# Patient Record
Sex: FEMALE | NOT HISPANIC OR LATINO | URBAN - METROPOLITAN AREA
[De-identification: names, ages, dates, MRNs, and addresses within clinical notes are randomized per-mention and may not be internally consistent; named-entity substitution may affect disease eponyms.]

---

## 2019-07-18 ENCOUNTER — EMERGENCY (EMERGENCY)
Facility: HOSPITAL | Age: 44
LOS: 1 days | Discharge: LEFT BEFORE DISCHARGE | End: 2019-07-18

## 2019-07-18 VITALS
HEART RATE: 120 BPM | DIASTOLIC BLOOD PRESSURE: 78 MMHG | SYSTOLIC BLOOD PRESSURE: 118 MMHG | WEIGHT: 188.05 LBS | TEMPERATURE: 100 F | RESPIRATION RATE: 20 BRPM | HEIGHT: 64 IN | OXYGEN SATURATION: 96 %

## 2019-07-18 NOTE — ED ADULT NURSE NOTE - EXPLANATION OF PATIENT'S REASON FOR LEAVING
pt states she feels better and wants to follow up with her doctor. pt educated about recommendation to get blood work and monitoring but pt states she feels better and wants to go home.  pt encouraged to return to ED if necessary

## 2022-04-22 PROBLEM — Z00.00 ENCOUNTER FOR PREVENTIVE HEALTH EXAMINATION: Status: ACTIVE | Noted: 2022-04-22

## 2022-07-25 ENCOUNTER — APPOINTMENT (OUTPATIENT)
Dept: GASTROENTEROLOGY | Facility: CLINIC | Age: 47
End: 2022-07-25

## 2022-07-25 VITALS
BODY MASS INDEX: 33.22 KG/M2 | DIASTOLIC BLOOD PRESSURE: 76 MMHG | OXYGEN SATURATION: 99 % | HEART RATE: 65 BPM | WEIGHT: 197 LBS | SYSTOLIC BLOOD PRESSURE: 107 MMHG | HEIGHT: 64.75 IN

## 2022-07-25 DIAGNOSIS — Z80.3 FAMILY HISTORY OF MALIGNANT NEOPLASM OF BREAST: ICD-10-CM

## 2022-07-25 DIAGNOSIS — Z78.9 OTHER SPECIFIED HEALTH STATUS: ICD-10-CM

## 2022-07-25 DIAGNOSIS — Z87.891 PERSONAL HISTORY OF NICOTINE DEPENDENCE: ICD-10-CM

## 2022-07-25 DIAGNOSIS — K90.0 CELIAC DISEASE: ICD-10-CM

## 2022-07-25 DIAGNOSIS — G43.909 MIGRAINE, UNSPECIFIED, NOT INTRACTABLE, W/OUT STATUS MIGRAINOSUS: ICD-10-CM

## 2022-07-25 DIAGNOSIS — Z79.899 OTHER LONG TERM (CURRENT) DRUG THERAPY: ICD-10-CM

## 2022-07-25 DIAGNOSIS — Z86.59 PERSONAL HISTORY OF OTHER MENTAL AND BEHAVIORAL DISORDERS: ICD-10-CM

## 2022-07-25 PROCEDURE — 99204 OFFICE O/P NEW MOD 45 MIN: CPT

## 2022-07-25 RX ORDER — ONABOTULINUMTOXINA 200 [USP'U]/1
200 INJECTION, POWDER, LYOPHILIZED, FOR SOLUTION INTRADERMAL; INTRAMUSCULAR
Qty: 1 | Refills: 0 | Status: ACTIVE | COMMUNITY
Start: 2021-08-20

## 2022-07-25 NOTE — ASSESSMENT
[FreeTextEntry1] : IMPRESSION: \par #  Celiac disease - follows a strict gluten diet\par -  She had blood test and found to have celiac disease,\par -  She had a repeat endoscopy a year later after gluten exposure and was told that biopsies showed celiac disease. \par -  Grandfather  from Non-Hodgkins lymphoma - he may have had celiac disease. \par \par #  Chronic constipation - since 14 years \par -  A couple of time she took linzess, but had chest pain, sweats and diarrhea. \par -  She has tried MiraLAX three times a day - responds, has bowel movement but makes her very gassy. \par -  Used to take 6 stool softeners every night - did not help. \par -  Colonoscopy 5 years ago by Dr. Chang- no pathology found - was told to have repeat exam in 10 years.\par  -  She has two or three total colonoscopies - no pathology found - last exam 5 years ago. \par \par #  Patient denies family history of GI cancers.\par \par \par PLAN:\par DIscussed long term complications of gluten exposure.  She will adhere to a gluten free diet.  Blood test once a day \par \par I have advised the patient to arrange for a colonoscopy to rule out colon polyps, colorectal cancer etc. under monitored anesthesia care.  Risks such as perforation  (4 in 10,000) requiring surgery, bleeding (8 in 10,000), infection, diverticulitis, colitis, missed colon cancer (2% to 6%), internal organ injury, etc, adverse reaction to medication etc. and risks of anesthesia including cardiopulmonary compromise were discussed with patient.  Alternatives were discussed.  Patient verbalized understanding and agrees to proceed with a colonoscopy under anesthesia.  MiraLAX once a day. \par \par

## 2022-07-25 NOTE — PHYSICAL EXAM
[General Appearance - Alert] : alert [General Appearance - In No Acute Distress] : in no acute distress [Sclera] : the sclera and conjunctiva were normal [Outer Ear] : the ears and nose were normal in appearance [Hearing Threshold Finger Rub Not [place]] : hearing was normal [Neck Appearance] : the appearance of the neck was normal [Neck Cervical Mass (___cm)] : no neck mass was observed [Auscultation Breath Sounds / Voice Sounds] : lungs were clear to auscultation bilaterally [Heart Rate And Rhythm] : heart rate was normal and rhythm regular [Heart Sounds] : normal S1 and S2 [Heart Sounds Gallop] : no gallops [Murmurs] : no murmurs [Heart Sounds Pericardial Friction Rub] : no pericardial rub [Bowel Sounds] : normal bowel sounds [Abdomen Soft] : soft [Abdomen Tenderness] : non-tender [Abdomen Mass (___ Cm)] : no abdominal mass palpated [Cervical Lymph Nodes Enlarged Posterior Bilaterally] : posterior cervical [Cervical Lymph Nodes Enlarged Anterior Bilaterally] : anterior cervical [Supraclavicular Lymph Nodes Enlarged Bilaterally] : supraclavicular [Abnormal Walk] : normal gait [Nail Clubbing] : no clubbing  or cyanosis of the fingernails [Skin Color & Pigmentation] : normal skin color and pigmentation [] : no rash [Oriented To Time, Place, And Person] : oriented to person, place, and time [Impaired Insight] : insight and judgment were intact [Affect] : the affect was normal

## 2022-07-25 NOTE — HISTORY OF PRESENT ILLNESS
[de-identified] : 48 y/o mother of two with Hx of migraines, depression who presents for evaluation of celiac disease, and constipation. \par \par 14 years ago she had IBS symptoms, developed anal fissure from constipation - she had blood test and found to have celiac disease, she then avoided gluten and had upper endoscopy and biopsies were negative (she had been excluding gluten).  She had a repeat endoscopy a year later after gluten exposure and was told that biopsies showed celiac disease. \par \par Since then gluten free diet (strict). \par \par Feels OK while she is on a gluten free diet - less anxiety, less brain fog, headaches, less eczema.\par \par Constipation since 14 years - has a bowel movement (small) daily - when she has cross contamination of gluten the diarrhea.A couple of time she took linzess, but had chest pain, sweats and diarrhea.   Occasional rectal bleeding noticed when wiping.  \par \par She has tried MiraLAX three times a day - responds, has bowel movement but makes her very gassy. \par \par Used to take 6 stool softeners every night - did not help. \par \par Sensitive to dairy. \par \par Colonoscopy 5 years ago by Dr. Chang- no pathology found - was told to have repeat exam in 10 years.\par  \par She has two or three total colonoscopies - no pathology found - last exam 5 years ago. \par \par Grandfather  from Non-Hodgkins lymphoma - he may have had celiac disease.  \par Patient denies family history of GI cancers.\par \par Sister has type 1 diabetes, and another sister has RA. \par \par Fatigue, joint pain.  All other review of systems are negative.  Denies cardiac symptoms.

## 2022-07-28 ENCOUNTER — NON-APPOINTMENT (OUTPATIENT)
Age: 47
End: 2022-07-28

## 2022-07-28 DIAGNOSIS — K59.09 OTHER CONSTIPATION: ICD-10-CM

## 2022-07-28 LAB
ALBUMIN SERPL ELPH-MCNC: 4.3 G/DL
ALP BLD-CCNC: 63 U/L
ALT SERPL-CCNC: 19 U/L
ANION GAP SERPL CALC-SCNC: 9 MMOL/L
AST SERPL-CCNC: 20 U/L
BASOPHILS # BLD AUTO: 0.08 K/UL
BASOPHILS NFR BLD AUTO: 1.8 %
BILIRUB SERPL-MCNC: 0.2 MG/DL
BUN SERPL-MCNC: 10 MG/DL
CALCIUM SERPL-MCNC: 8.8 MG/DL
CHLORIDE SERPL-SCNC: 106 MMOL/L
CO2 SERPL-SCNC: 25 MMOL/L
CREAT SERPL-MCNC: 0.82 MG/DL
EGFR: 88 ML/MIN/1.73M2
ENDOMYSIUM IGA SER QL: NEGATIVE
ENDOMYSIUM IGA TITR SER: NORMAL
EOSINOPHIL # BLD AUTO: 0.21 K/UL
EOSINOPHIL NFR BLD AUTO: 4.7 %
FERRITIN SERPL-MCNC: 11 NG/ML
GLIADIN IGA SER QL: 6.1 UNITS
GLIADIN IGG SER QL: <5 UNITS
GLIADIN PEPTIDE IGA SER-ACNC: NEGATIVE
GLIADIN PEPTIDE IGG SER-ACNC: NEGATIVE
GLUCOSE SERPL-MCNC: 87 MG/DL
HCT VFR BLD CALC: 37.6 %
HGB BLD-MCNC: 11.2 G/DL
IGA SER QL IEP: 98 MG/DL
IMM GRANULOCYTES NFR BLD AUTO: 0.2 %
IRON SATN MFR SERPL: 8 %
IRON SERPL-MCNC: 34 UG/DL
LYMPHOCYTES # BLD AUTO: 1.05 K/UL
LYMPHOCYTES NFR BLD AUTO: 23.4 %
MAN DIFF?: NORMAL
MCHC RBC-ENTMCNC: 24.1 PG
MCHC RBC-ENTMCNC: 29.8 GM/DL
MCV RBC AUTO: 81 FL
MONOCYTES # BLD AUTO: 0.32 K/UL
MONOCYTES NFR BLD AUTO: 7.1 %
NEUTROPHILS # BLD AUTO: 2.82 K/UL
NEUTROPHILS NFR BLD AUTO: 62.8 %
PLATELET # BLD AUTO: 284 K/UL
POTASSIUM SERPL-SCNC: 4.8 MMOL/L
PROT SERPL-MCNC: 6.3 G/DL
RBC # BLD: 4.64 M/UL
RBC # FLD: 16.3 %
SODIUM SERPL-SCNC: 140 MMOL/L
TIBC SERPL-MCNC: 423 UG/DL
TTG IGA SER IA-ACNC: <1.2 U/ML
TTG IGA SER-ACNC: NEGATIVE
TTG IGG SER IA-ACNC: <1.2 U/ML
TTG IGG SER IA-ACNC: NEGATIVE
UIBC SERPL-MCNC: 389 UG/DL
WBC # FLD AUTO: 4.49 K/UL

## 2022-08-02 ENCOUNTER — NON-APPOINTMENT (OUTPATIENT)
Age: 47
End: 2022-08-02

## 2022-11-04 ENCOUNTER — APPOINTMENT (OUTPATIENT)
Dept: GASTROENTEROLOGY | Facility: HOSPITAL | Age: 47
End: 2022-11-04

## 2022-12-07 ENCOUNTER — APPOINTMENT (OUTPATIENT)
Dept: ORTHOPEDIC SURGERY | Facility: CLINIC | Age: 47
End: 2022-12-07

## 2022-12-07 DIAGNOSIS — M25.561 PAIN IN RIGHT KNEE: ICD-10-CM

## 2022-12-07 PROCEDURE — 73502 X-RAY EXAM HIP UNI 2-3 VIEWS: CPT

## 2022-12-07 PROCEDURE — 73562 X-RAY EXAM OF KNEE 3: CPT | Mod: RT

## 2022-12-07 PROCEDURE — 99213 OFFICE O/P EST LOW 20 MIN: CPT

## 2022-12-08 NOTE — DISCUSSION/SUMMARY
[de-identified] : The patient has a right knee patellar tendonitis vs tear vs. meniscal tear.\par \par The patient's xrays are negative for fracture. \par She declined oral steroid. \par She was prescribed cyclobenzaprine.\par All risks, benefits, and alternatives were discussed for this medication.\par She will go for MRI to r/o soft tissue damage. \par She will f/u to see Dr. Simon.

## 2022-12-08 NOTE — HISTORY OF PRESENT ILLNESS
[de-identified] : The patient is a 46 yo F presenting with right knee and right hip pain. The patient reports that she has been dealing with episodic right knee pain over the past few months but earlier today felt her knee buckle and dropped to the ground. She reports this being the worst episode and her pain has not subsided. The patient did not hear a pop at the time of fall. She denies sensation change or paresthesias. She has mild swelling to the anterior knee. She has not had surgery in the past. She is unable and not confident to ambulate at this time.

## 2022-12-08 NOTE — PHYSICAL EXAM
[Normal Coordination] : normal coordination [Normal Sensation] : normal sensation [Normal Mood and Affect] : normal mood and affect [Orientated] : orientated [Able to Communicate] : able to communicate [Normal Skin] : normal skin [Well Developed] : well developed [Well Nourished] : well nourished [Peripheral vascular exam is grossly normal] : peripheral vascular exam is grossly normal [NL (120)] : flexion 120 degrees [NL (30)] : extension 30 degrees [NL (35)] : adduction 35 degrees [NL (45)] : abduction 45 degrees [2+] : posterior tibialis pulse: 2+ [NL (0)] : extension 0 degrees [4___] : hamstring 4[unfilled]/5 [] : uses wheelchair [Right] : right knee [AP] : anteroposterior [Lateral] : lateral [There are no fractures, subluxations or dislocations. No significant abnormalities are seen] : There are no fractures, subluxations or dislocations. No significant abnormalities are seen [FreeTextEntry3] : Knee exam limited due to patient pain.  [FreeTextEntry8] : + pain to patellar tendon.  [FreeTextEntry9] : ROM limited due to pain.  [TWNoteComboBox7] : flexion 50 degrees

## 2022-12-09 ENCOUNTER — RESULT REVIEW (OUTPATIENT)
Age: 47
End: 2022-12-09

## 2022-12-12 ENCOUNTER — APPOINTMENT (OUTPATIENT)
Dept: ORTHOPEDIC SURGERY | Facility: CLINIC | Age: 47
End: 2022-12-12

## 2022-12-12 VITALS — WEIGHT: 197 LBS | HEIGHT: 64 IN | BODY MASS INDEX: 33.63 KG/M2

## 2022-12-12 DIAGNOSIS — S83.241A OTHER TEAR OF MEDIAL MENISCUS, CURRENT INJURY, RIGHT KNEE, INITIAL ENCOUNTER: ICD-10-CM

## 2022-12-12 PROCEDURE — 99214 OFFICE O/P EST MOD 30 MIN: CPT

## 2022-12-12 NOTE — PHYSICAL EXAM
[Normal Sensation] : normal sensation [Normal Mood and Affect] : normal mood and affect [Orientated] : orientated [Right] : right knee [NL (140)] : flexion 140 degrees [NL (0)] : extension 0 degrees [5___] : hamstring 5[unfilled]/5 [] : ligamentously stable

## 2022-12-12 NOTE — ASSESSMENT
[FreeTextEntry1] : 47F p/w R knee MMT\par \par Begin PT\par Meloxicam for pain\par f/u sports team re possible knee scope

## 2022-12-12 NOTE — HISTORY OF PRESENT ILLNESS
[Sudden] : sudden [3] : 3 [2] : 2 [Dull/Aching] : dull/aching [Radiating] : radiating [Constant] : constant [Meds] : meds [Walking] : walking [de-identified] : 47F p/w R knee pain. She notes her knee gave out on her while walking and was unable to walk well after. She was seen at  and given a knee brace and sent for an MRI. She conplains of posterior pain and medial/lateral pain. [] : no [FreeTextEntry1] : rt KNEE  [FreeTextEntry3] : 12/8/22 [FreeTextEntry5] : pt has been having intermittent knee pain until 12/8/22 her knee gave out and she was unable to get up pt visited local [facility] received mri and dx of meniscal tear  [FreeTextEntry7] : glute to mid calf  [de-identified] : mri

## 2022-12-13 ENCOUNTER — FORM ENCOUNTER (OUTPATIENT)
Age: 47
End: 2022-12-13

## 2022-12-21 DIAGNOSIS — K90.0 CELIAC DISEASE: ICD-10-CM

## 2022-12-22 ENCOUNTER — APPOINTMENT (OUTPATIENT)
Dept: ORTHOPEDIC SURGERY | Facility: CLINIC | Age: 47
End: 2022-12-22

## 2022-12-27 ENCOUNTER — NON-APPOINTMENT (OUTPATIENT)
Age: 47
End: 2022-12-27

## 2022-12-29 ENCOUNTER — TRANSCRIPTION ENCOUNTER (OUTPATIENT)
Age: 47
End: 2022-12-29

## 2022-12-30 ENCOUNTER — OUTPATIENT (OUTPATIENT)
Dept: OUTPATIENT SERVICES | Facility: HOSPITAL | Age: 47
LOS: 1 days | End: 2022-12-30
Payer: COMMERCIAL

## 2022-12-30 ENCOUNTER — APPOINTMENT (OUTPATIENT)
Dept: GASTROENTEROLOGY | Facility: HOSPITAL | Age: 47
End: 2022-12-30

## 2022-12-30 DIAGNOSIS — Z12.11 ENCOUNTER FOR SCREENING FOR MALIGNANT NEOPLASM OF COLON: ICD-10-CM

## 2022-12-30 DIAGNOSIS — K59.09 OTHER CONSTIPATION: ICD-10-CM

## 2022-12-30 LAB — HCG UR QL: NEGATIVE — SIGNIFICANT CHANGE UP

## 2022-12-30 PROCEDURE — G0121: CPT

## 2022-12-30 PROCEDURE — 45378 DIAGNOSTIC COLONOSCOPY: CPT

## 2022-12-30 PROCEDURE — 81025 URINE PREGNANCY TEST: CPT

## 2023-01-19 ENCOUNTER — APPOINTMENT (OUTPATIENT)
Dept: ORTHOPEDIC SURGERY | Facility: CLINIC | Age: 48
End: 2023-01-19

## 2023-10-24 ENCOUNTER — NON-APPOINTMENT (OUTPATIENT)
Age: 48
End: 2023-10-24

## 2023-10-24 RX ORDER — SERTRALINE HYDROCHLORIDE 100 MG/1
100 TABLET, FILM COATED ORAL
Qty: 135 | Refills: 0 | Status: DISCONTINUED | COMMUNITY
Start: 2022-06-21 | End: 2023-10-24

## 2023-10-24 RX ORDER — LAMOTRIGINE 250 MG/1
250 TABLET, FILM COATED, EXTENDED RELEASE ORAL
Refills: 0 | Status: ACTIVE | COMMUNITY
Start: 2023-10-24

## 2023-10-24 RX ORDER — POLYETHYLENE GLYCOL-3350, SODIUM CHLORIDE, POTASSIUM CHLORIDE AND SODIUM BICARBONATE 420; 11.2; 5.72; 1.48 G/438.4G; G/438.4G; G/438.4G; G/438.4G
420 POWDER, FOR SOLUTION ORAL
Qty: 1 | Refills: 0 | Status: DISCONTINUED | COMMUNITY
Start: 2022-07-28 | End: 2023-10-24

## 2023-10-24 RX ORDER — SERTRALINE HYDROCHLORIDE 50 MG/1
50 TABLET, FILM COATED ORAL DAILY
Refills: 0 | Status: ACTIVE | COMMUNITY
Start: 2023-10-24

## 2023-10-24 RX ORDER — MAGNESIUM 30 MG
30 TABLET ORAL
Refills: 0 | Status: ACTIVE | COMMUNITY
Start: 2023-10-24

## 2023-10-24 RX ORDER — RIBOFLAVIN (VITAMIN B2) 100 MG
100 TABLET ORAL
Refills: 0 | Status: ACTIVE | COMMUNITY
Start: 2023-10-24

## 2023-10-24 RX ORDER — CALCIUM CARBONATE/VITAMIN D3 500-10/5ML
30 LIQUID (ML) ORAL
Refills: 0 | Status: ACTIVE | COMMUNITY
Start: 2023-10-24

## 2023-10-24 RX ORDER — CYCLOBENZAPRINE HYDROCHLORIDE 15 MG/1
15 CAPSULE, EXTENDED RELEASE ORAL DAILY
Qty: 14 | Refills: 0 | Status: DISCONTINUED | COMMUNITY
Start: 2022-12-07 | End: 2023-10-24

## 2023-10-24 RX ORDER — SERTRALINE 25 MG/1
25 TABLET, FILM COATED ORAL DAILY
Refills: 0 | Status: ACTIVE | COMMUNITY
Start: 2023-10-24

## 2023-10-24 RX ORDER — OMEPRAZOLE 20 MG/1
20 TABLET, DELAYED RELEASE ORAL
Refills: 0 | Status: ACTIVE | COMMUNITY
Start: 2023-10-24

## 2023-10-24 RX ORDER — SEMAGLUTIDE 2.4 MG/.75ML
2.4 INJECTION, SOLUTION SUBCUTANEOUS
Refills: 0 | Status: ACTIVE | COMMUNITY
Start: 2023-10-24

## 2023-10-27 ENCOUNTER — NON-APPOINTMENT (OUTPATIENT)
Age: 48
End: 2023-10-27

## 2023-10-27 ENCOUNTER — APPOINTMENT (OUTPATIENT)
Dept: CARDIOLOGY | Facility: CLINIC | Age: 48
End: 2023-10-27
Payer: COMMERCIAL

## 2023-10-27 VITALS
DIASTOLIC BLOOD PRESSURE: 70 MMHG | WEIGHT: 175 LBS | SYSTOLIC BLOOD PRESSURE: 102 MMHG | HEART RATE: 75 BPM | BODY MASS INDEX: 30.04 KG/M2 | OXYGEN SATURATION: 98 %

## 2023-10-27 DIAGNOSIS — E78.5 HYPERLIPIDEMIA, UNSPECIFIED: ICD-10-CM

## 2023-10-27 DIAGNOSIS — F31.81 BIPOLAR II DISORDER: ICD-10-CM

## 2023-10-27 DIAGNOSIS — Z83.3 FAMILY HISTORY OF DIABETES MELLITUS: ICD-10-CM

## 2023-10-27 DIAGNOSIS — R55 SYNCOPE AND COLLAPSE: ICD-10-CM

## 2023-10-27 PROCEDURE — 99214 OFFICE O/P EST MOD 30 MIN: CPT | Mod: 25

## 2023-10-27 PROCEDURE — 93000 ELECTROCARDIOGRAM COMPLETE: CPT

## 2023-10-27 RX ORDER — TOPIRAMATE 50 MG/1
50 TABLET, FILM COATED ORAL
Qty: 90 | Refills: 1 | Status: ACTIVE | COMMUNITY

## 2023-11-14 ENCOUNTER — APPOINTMENT (OUTPATIENT)
Dept: CARDIOLOGY | Facility: CLINIC | Age: 48
End: 2023-11-14

## 2025-04-28 ENCOUNTER — APPOINTMENT (OUTPATIENT)
Dept: UROGYNECOLOGY | Facility: CLINIC | Age: 50
End: 2025-04-28
Payer: COMMERCIAL

## 2025-04-28 VITALS
HEIGHT: 64 IN | OXYGEN SATURATION: 98 % | SYSTOLIC BLOOD PRESSURE: 102 MMHG | HEART RATE: 82 BPM | BODY MASS INDEX: 28.51 KG/M2 | WEIGHT: 167 LBS | DIASTOLIC BLOOD PRESSURE: 62 MMHG | TEMPERATURE: 97 F

## 2025-04-28 DIAGNOSIS — N39.41 URGE INCONTINENCE: ICD-10-CM

## 2025-04-28 DIAGNOSIS — R35.0 FREQUENCY OF MICTURITION: ICD-10-CM

## 2025-04-28 DIAGNOSIS — R35.1 NOCTURIA: ICD-10-CM

## 2025-04-28 DIAGNOSIS — N39.3 STRESS INCONTINENCE (FEMALE) (MALE): ICD-10-CM

## 2025-04-28 DIAGNOSIS — N81.9 FEMALE GENITAL PROLAPSE, UNSPECIFIED: ICD-10-CM

## 2025-04-28 DIAGNOSIS — R39.15 URGENCY OF URINATION: ICD-10-CM

## 2025-04-28 LAB
BILIRUB UR QL STRIP: NEGATIVE
CLARITY UR: CLEAR
COLLECTION METHOD: NORMAL
GLUCOSE UR-MCNC: NEGATIVE
HCG UR QL: 0.2 EU/DL
HGB UR QL STRIP.AUTO: NEGATIVE
KETONES UR-MCNC: NEGATIVE
LEUKOCYTE ESTERASE UR QL STRIP: NEGATIVE
NITRITE UR QL STRIP: NEGATIVE
PH UR STRIP: 7
PROT UR STRIP-MCNC: NEGATIVE
SP GR UR STRIP: 1.01

## 2025-04-28 PROCEDURE — 81002 URINALYSIS NONAUTO W/O SCOPE: CPT

## 2025-04-28 PROCEDURE — 99204 OFFICE O/P NEW MOD 45 MIN: CPT | Mod: 25

## 2025-04-28 PROCEDURE — 99459 PELVIC EXAMINATION: CPT

## 2025-04-28 PROCEDURE — 51701 INSERT BLADDER CATHETER: CPT | Mod: 59

## 2025-05-27 ENCOUNTER — APPOINTMENT (OUTPATIENT)
Dept: UROGYNECOLOGY | Facility: CLINIC | Age: 50
End: 2025-05-27

## 2025-05-27 ENCOUNTER — NON-APPOINTMENT (OUTPATIENT)
Age: 50
End: 2025-05-27

## 2025-05-28 ENCOUNTER — APPOINTMENT (OUTPATIENT)
Dept: UROGYNECOLOGY | Facility: CLINIC | Age: 50
End: 2025-05-28

## 2025-05-28 VITALS
DIASTOLIC BLOOD PRESSURE: 70 MMHG | HEIGHT: 64 IN | SYSTOLIC BLOOD PRESSURE: 112 MMHG | WEIGHT: 174 LBS | BODY MASS INDEX: 29.71 KG/M2 | HEART RATE: 90 BPM | RESPIRATION RATE: 14 BRPM

## 2025-05-28 DIAGNOSIS — N81.9 FEMALE GENITAL PROLAPSE, UNSPECIFIED: ICD-10-CM

## 2025-05-28 DIAGNOSIS — N39.3 STRESS INCONTINENCE (FEMALE) (MALE): ICD-10-CM

## 2025-05-28 PROCEDURE — 57160 INSERT PESSARY/OTHER DEVICE: CPT

## 2025-05-28 PROCEDURE — A4562: CPT

## 2025-06-03 ENCOUNTER — APPOINTMENT (OUTPATIENT)
Dept: SURGERY | Facility: CLINIC | Age: 50
End: 2025-06-03
Payer: COMMERCIAL

## 2025-06-03 VITALS
SYSTOLIC BLOOD PRESSURE: 92 MMHG | HEART RATE: 93 BPM | WEIGHT: 170 LBS | BODY MASS INDEX: 29.02 KG/M2 | HEIGHT: 64 IN | OXYGEN SATURATION: 97 % | DIASTOLIC BLOOD PRESSURE: 67 MMHG

## 2025-06-03 DIAGNOSIS — K80.12 CALCULUS OF GALLBLADDER WITH ACUTE AND CHRONIC CHOLECYSTITIS W/OUT OBSTRUCTION: ICD-10-CM

## 2025-06-03 PROCEDURE — 99204 OFFICE O/P NEW MOD 45 MIN: CPT | Mod: 57

## 2025-06-03 RX ORDER — NAPROXEN 500 MG/1
500 TABLET ORAL
Refills: 0 | Status: ACTIVE | COMMUNITY

## 2025-06-05 ENCOUNTER — OUTPATIENT (OUTPATIENT)
Dept: OUTPATIENT SERVICES | Facility: HOSPITAL | Age: 50
LOS: 1 days | End: 2025-06-05
Payer: COMMERCIAL

## 2025-06-05 VITALS
TEMPERATURE: 98 F | RESPIRATION RATE: 16 BRPM | OXYGEN SATURATION: 99 % | HEART RATE: 90 BPM | SYSTOLIC BLOOD PRESSURE: 121 MMHG | DIASTOLIC BLOOD PRESSURE: 75 MMHG | WEIGHT: 166.89 LBS

## 2025-06-05 DIAGNOSIS — Z98.890 OTHER SPECIFIED POSTPROCEDURAL STATES: Chronic | ICD-10-CM

## 2025-06-05 DIAGNOSIS — K80.12 CALCULUS OF GALLBLADDER WITH ACUTE AND CHRONIC CHOLECYSTITIS WITHOUT OBSTRUCTION: ICD-10-CM

## 2025-06-05 DIAGNOSIS — Z01.818 ENCOUNTER FOR OTHER PREPROCEDURAL EXAMINATION: ICD-10-CM

## 2025-06-05 DIAGNOSIS — Z98.891 HISTORY OF UTERINE SCAR FROM PREVIOUS SURGERY: Chronic | ICD-10-CM

## 2025-06-05 LAB
ALBUMIN SERPL ELPH-MCNC: 3.6 G/DL — SIGNIFICANT CHANGE UP (ref 3.3–5)
ALP SERPL-CCNC: 73 U/L — SIGNIFICANT CHANGE UP (ref 40–120)
ALT FLD-CCNC: 28 U/L — SIGNIFICANT CHANGE UP (ref 12–78)
ANION GAP SERPL CALC-SCNC: 8 MMOL/L — SIGNIFICANT CHANGE UP (ref 5–17)
AST SERPL-CCNC: 12 U/L — LOW (ref 15–37)
BILIRUB SERPL-MCNC: 0.4 MG/DL — SIGNIFICANT CHANGE UP (ref 0.2–1.2)
BLD GP AB SCN SERPL QL: SIGNIFICANT CHANGE UP
BUN SERPL-MCNC: 4 MG/DL — LOW (ref 7–23)
CALCIUM SERPL-MCNC: 9.4 MG/DL — SIGNIFICANT CHANGE UP (ref 8.5–10.1)
CHLORIDE SERPL-SCNC: 109 MMOL/L — HIGH (ref 96–108)
CO2 SERPL-SCNC: 25 MMOL/L — SIGNIFICANT CHANGE UP (ref 22–31)
CREAT SERPL-MCNC: 0.87 MG/DL — SIGNIFICANT CHANGE UP (ref 0.5–1.3)
EGFR: 80 ML/MIN/1.73M2 — SIGNIFICANT CHANGE UP
EGFR: 80 ML/MIN/1.73M2 — SIGNIFICANT CHANGE UP
GLUCOSE SERPL-MCNC: 78 MG/DL — SIGNIFICANT CHANGE UP (ref 70–99)
HCG UR QL: NEGATIVE — SIGNIFICANT CHANGE UP
HCT VFR BLD CALC: 39.6 % — SIGNIFICANT CHANGE UP (ref 34.5–45)
HGB BLD-MCNC: 13.2 G/DL — SIGNIFICANT CHANGE UP (ref 11.5–15.5)
MCHC RBC-ENTMCNC: 27.8 PG — SIGNIFICANT CHANGE UP (ref 27–34)
MCHC RBC-ENTMCNC: 33.3 G/DL — SIGNIFICANT CHANGE UP (ref 32–36)
MCV RBC AUTO: 83.5 FL — SIGNIFICANT CHANGE UP (ref 80–100)
NRBC BLD AUTO-RTO: 0 /100 WBCS — SIGNIFICANT CHANGE UP (ref 0–0)
PLATELET # BLD AUTO: 291 K/UL — SIGNIFICANT CHANGE UP (ref 150–400)
POTASSIUM SERPL-MCNC: 3.5 MMOL/L — SIGNIFICANT CHANGE UP (ref 3.5–5.3)
POTASSIUM SERPL-SCNC: 3.5 MMOL/L — SIGNIFICANT CHANGE UP (ref 3.5–5.3)
PROT SERPL-MCNC: 7.3 G/DL — SIGNIFICANT CHANGE UP (ref 6–8.3)
RBC # BLD: 4.74 M/UL — SIGNIFICANT CHANGE UP (ref 3.8–5.2)
RBC # FLD: 12.7 % — SIGNIFICANT CHANGE UP (ref 10.3–14.5)
SODIUM SERPL-SCNC: 142 MMOL/L — SIGNIFICANT CHANGE UP (ref 135–145)
WBC # BLD: 5.49 K/UL — SIGNIFICANT CHANGE UP (ref 3.8–10.5)
WBC # FLD AUTO: 5.49 K/UL — SIGNIFICANT CHANGE UP (ref 3.8–10.5)

## 2025-06-05 PROCEDURE — 81025 URINE PREGNANCY TEST: CPT

## 2025-06-05 PROCEDURE — 86900 BLOOD TYPING SEROLOGIC ABO: CPT

## 2025-06-05 PROCEDURE — 86901 BLOOD TYPING SEROLOGIC RH(D): CPT

## 2025-06-05 PROCEDURE — 86850 RBC ANTIBODY SCREEN: CPT

## 2025-06-05 PROCEDURE — 80053 COMPREHEN METABOLIC PANEL: CPT

## 2025-06-05 PROCEDURE — G0463: CPT

## 2025-06-05 PROCEDURE — 85027 COMPLETE CBC AUTOMATED: CPT

## 2025-06-05 PROCEDURE — 36415 COLL VENOUS BLD VENIPUNCTURE: CPT

## 2025-06-05 NOTE — H&P PST ADULT - NSICDXPASTMEDICALHX_GEN_ALL_CORE_FT
PAST MEDICAL HISTORY:  Celiac disease      PAST MEDICAL HISTORY:  ADHD     Anxiety and depression     Calculus of gallbladder with acute and chronic cholecystitis without obstruction     Celiac disease     Seasonal allergies

## 2025-06-05 NOTE — H&P PST ADULT - PROBLEM SELECTOR PLAN 2
Medical clearance needed as per surgeon. CBC, Comprehensive panel, T&S and UCG ordered. Pre-op instructions and surgical scrubs given and pt verbalized understanding.

## 2025-06-05 NOTE — H&P PST ADULT - NSICDXFAMILYHX_GEN_ALL_CORE_FT
FAMILY HISTORY:  Father  Still living? Yes, Estimated age: Age Unknown  FH: hyperlipidemia, Age at diagnosis: Age Unknown    Mother  Still living? Yes, Estimated age: Age Unknown  Family history of breast cancer in mother, Age at diagnosis: Age Unknown  FH: CAD (coronary artery disease), Age at diagnosis: Age Unknown  FH: hypertension, Age at diagnosis: Age Unknown    Sibling  Still living? Yes, Estimated age: Age Unknown  FH: type 1 diabetes mellitus, Age at diagnosis: Age Unknown

## 2025-06-05 NOTE — H&P PST ADULT - ASSESSMENT
51 y/o female with calculus of gallbladder with acute and chronic cholecystitis without obstruction.

## 2025-06-05 NOTE — H&P PST ADULT - HISTORY OF PRESENT ILLNESS
49 y/o female with PMH of Bipolar disorder     Pt s/p ER visit at [facility] on 6/1/25 and diagnosed with cholelithiasis. Pt currently on low fat diet.   Pt still complaining of RUQ abdominal pain after eating fatty foods.   Pt denies n/v/d and constipation.  49 y/o female with PMH of Bipolar disorder, ADHD, anxiety and depression here for PST. Pt s/p ER visit at [facility] on 6/1/25 for severe abdominal pain and diagnosed with cholelithiasis. Pt still complaining of RUQ abdominal pain after eating fatty foods.   Pt denies n/v/d and constipation.  Pt currently on low fat diet. Pt electing for robotic assisted laparoscopic cholecystectomy on 6/13/25.

## 2025-06-12 NOTE — ASU PATIENT PROFILE, ADULT - FALL HARM RISK - UNIVERSAL INTERVENTIONS
Bed in lowest position, wheels locked, appropriate side rails in place/Call bell, personal items and telephone in reach/Instruct patient to call for assistance before getting out of bed or chair/Non-slip footwear when patient is out of bed/[place] to call system/Physically safe environment - no spills, clutter or unnecessary equipment/Purposeful Proactive Rounding/Room/bathroom lighting operational, light cord in reach

## 2025-06-12 NOTE — ASU PATIENT PROFILE, ADULT - NSICDXPASTMEDICALHX_GEN_ALL_CORE_FT
PAST MEDICAL HISTORY:  ADHD     Anxiety and depression     Calculus of gallbladder with acute and chronic cholecystitis without obstruction     Celiac disease     Seasonal allergies

## 2025-06-13 ENCOUNTER — APPOINTMENT (OUTPATIENT)
Dept: SURGERY | Facility: HOSPITAL | Age: 50
End: 2025-06-13

## 2025-06-13 ENCOUNTER — OUTPATIENT (OUTPATIENT)
Dept: OUTPATIENT SERVICES | Facility: HOSPITAL | Age: 50
LOS: 1 days | End: 2025-06-13
Payer: COMMERCIAL

## 2025-06-13 ENCOUNTER — RESULT REVIEW (OUTPATIENT)
Age: 50
End: 2025-06-13

## 2025-06-13 ENCOUNTER — TRANSCRIPTION ENCOUNTER (OUTPATIENT)
Age: 50
End: 2025-06-13

## 2025-06-13 VITALS
HEIGHT: 63.98 IN | RESPIRATION RATE: 12 BRPM | WEIGHT: 167.11 LBS | SYSTOLIC BLOOD PRESSURE: 95 MMHG | DIASTOLIC BLOOD PRESSURE: 68 MMHG | TEMPERATURE: 98 F | OXYGEN SATURATION: 97 % | HEART RATE: 71 BPM

## 2025-06-13 VITALS
SYSTOLIC BLOOD PRESSURE: 107 MMHG | RESPIRATION RATE: 18 BRPM | DIASTOLIC BLOOD PRESSURE: 66 MMHG | OXYGEN SATURATION: 98 % | HEART RATE: 79 BPM

## 2025-06-13 DIAGNOSIS — Z98.891 HISTORY OF UTERINE SCAR FROM PREVIOUS SURGERY: Chronic | ICD-10-CM

## 2025-06-13 DIAGNOSIS — Z98.890 OTHER SPECIFIED POSTPROCEDURAL STATES: Chronic | ICD-10-CM

## 2025-06-13 DIAGNOSIS — K80.12 CALCULUS OF GALLBLADDER WITH ACUTE AND CHRONIC CHOLECYSTITIS WITHOUT OBSTRUCTION: ICD-10-CM

## 2025-06-13 LAB
ABO RH CONFIRMATION: SIGNIFICANT CHANGE UP
HCG UR QL: NEGATIVE — SIGNIFICANT CHANGE UP

## 2025-06-13 PROCEDURE — C9399: CPT

## 2025-06-13 PROCEDURE — 88304 TISSUE EXAM BY PATHOLOGIST: CPT | Mod: 26

## 2025-06-13 PROCEDURE — S2900: CPT

## 2025-06-13 PROCEDURE — C1889: CPT

## 2025-06-13 PROCEDURE — 47563 LAPARO CHOLECYSTECTOMY/GRAPH: CPT

## 2025-06-13 PROCEDURE — 88304 TISSUE EXAM BY PATHOLOGIST: CPT

## 2025-06-13 PROCEDURE — 81025 URINE PREGNANCY TEST: CPT

## 2025-06-13 PROCEDURE — 36415 COLL VENOUS BLD VENIPUNCTURE: CPT

## 2025-06-13 PROCEDURE — S2900 ROBOTIC SURGICAL SYSTEM: CPT | Mod: NC

## 2025-06-13 DEVICE — LIGATING CLIPS WECK HEMOLOK POLYMER MEDIUM-LARGE (GREEN) 6: Type: IMPLANTABLE DEVICE | Status: FUNCTIONAL

## 2025-06-13 RX ORDER — SODIUM CHLORIDE 9 G/1000ML
1000 INJECTION, SOLUTION INTRAVENOUS
Refills: 0 | Status: DISCONTINUED | OUTPATIENT
Start: 2025-06-13 | End: 2025-06-13

## 2025-06-13 RX ORDER — ONDANSETRON HCL/PF 4 MG/2 ML
4 VIAL (ML) INJECTION EVERY 6 HOURS
Refills: 0 | Status: ACTIVE | OUTPATIENT
Start: 2025-06-13 | End: 2026-05-12

## 2025-06-13 RX ORDER — SODIUM CHLORIDE 9 G/1000ML
1000 INJECTION, SOLUTION INTRAVENOUS
Refills: 0 | Status: DISCONTINUED | OUTPATIENT
Start: 2025-06-13 | End: 2025-06-15

## 2025-06-13 RX ORDER — IBUPROFEN 200 MG
1 TABLET ORAL
Qty: 20 | Refills: 0
Start: 2025-06-13

## 2025-06-13 RX ORDER — LAMOTRIGINE 150 MG/1
250 TABLET ORAL AT BEDTIME
Refills: 0 | Status: ACTIVE | OUTPATIENT
Start: 2025-06-13 | End: 2026-05-12

## 2025-06-13 RX ORDER — CEFAZOLIN SODIUM IN 0.9 % NACL 3 G/100 ML
2000 INTRAVENOUS SOLUTION, PIGGYBACK (ML) INTRAVENOUS ONCE
Refills: 0 | Status: COMPLETED | OUTPATIENT
Start: 2025-06-13 | End: 2025-06-13

## 2025-06-13 RX ORDER — OXYCODONE HYDROCHLORIDE 30 MG/1
5 TABLET ORAL EVERY 6 HOURS
Refills: 0 | Status: DISCONTINUED | OUTPATIENT
Start: 2025-06-13 | End: 2025-06-13

## 2025-06-13 RX ORDER — SERTRALINE 100 MG/1
0 TABLET, FILM COATED ORAL
Refills: 0 | DISCHARGE

## 2025-06-13 RX ORDER — TOPIRAMATE 25 MG/1
50 TABLET, FILM COATED ORAL AT BEDTIME
Refills: 0 | Status: ACTIVE | OUTPATIENT
Start: 2025-06-13 | End: 2026-05-12

## 2025-06-13 RX ORDER — POLYETHYLENE GLYCOL 3350 17 G/17G
17 POWDER, FOR SOLUTION ORAL
Refills: 0 | DISCHARGE

## 2025-06-13 RX ORDER — TOPIRAMATE 25 MG/1
0 TABLET, FILM COATED ORAL
Refills: 4 | DISCHARGE

## 2025-06-13 RX ORDER — ACETAMINOPHEN 500 MG/5ML
2 LIQUID (ML) ORAL
Qty: 24 | Refills: 0
Start: 2025-06-13

## 2025-06-13 RX ORDER — LORAZEPAM 4 MG/ML
0 VIAL (ML) INJECTION
Refills: 0 | DISCHARGE

## 2025-06-13 RX ORDER — INDOCYANINE GREEN AND WATER 25 MG
5 KIT INJECTION ONCE
Refills: 0 | Status: COMPLETED | OUTPATIENT
Start: 2025-06-13 | End: 2025-06-13

## 2025-06-13 RX ORDER — PROMETHAZINE HYDROCHLORIDE 25 MG/ML
3.12 INJECTION INTRAMUSCULAR; INTRAVENOUS ONCE
Refills: 0 | Status: COMPLETED | OUTPATIENT
Start: 2025-06-13 | End: 2025-06-13

## 2025-06-13 RX ORDER — ACETAMINOPHEN 500 MG/5ML
1000 LIQUID (ML) ORAL EVERY 6 HOURS
Refills: 0 | Status: ACTIVE | OUTPATIENT
Start: 2025-06-13 | End: 2026-05-12

## 2025-06-13 RX ORDER — LORAZEPAM 4 MG/ML
0.5 VIAL (ML) INJECTION EVERY 12 HOURS
Refills: 0 | Status: DISCONTINUED | OUTPATIENT
Start: 2025-06-13 | End: 2025-06-13

## 2025-06-13 RX ORDER — OXYCODONE HYDROCHLORIDE 30 MG/1
1 TABLET ORAL
Qty: 12 | Refills: 0
Start: 2025-06-13

## 2025-06-13 RX ORDER — OXYCODONE HYDROCHLORIDE 30 MG/1
5 TABLET ORAL ONCE
Refills: 0 | Status: DISCONTINUED | OUTPATIENT
Start: 2025-06-13 | End: 2025-06-13

## 2025-06-13 RX ORDER — IBUPROFEN 200 MG
600 TABLET ORAL EVERY 6 HOURS
Refills: 0 | Status: ACTIVE | OUTPATIENT
Start: 2025-06-13 | End: 2026-05-12

## 2025-06-13 RX ORDER — POLYETHYLENE GLYCOL 3350 17 G/17G
17 POWDER, FOR SOLUTION ORAL
Qty: 120 | Refills: 0
Start: 2025-06-13

## 2025-06-13 RX ORDER — HYDROMORPHONE/SOD CHLOR,ISO/PF 2 MG/10 ML
0.5 SYRINGE (ML) INJECTION
Refills: 0 | Status: DISCONTINUED | OUTPATIENT
Start: 2025-06-13 | End: 2025-06-15

## 2025-06-13 RX ORDER — LAMOTRIGINE 150 MG/1
0 TABLET ORAL
Refills: 0 | DISCHARGE

## 2025-06-13 RX ORDER — SERTRALINE 100 MG/1
200 TABLET, FILM COATED ORAL DAILY
Refills: 0 | Status: ACTIVE | OUTPATIENT
Start: 2025-06-13 | End: 2026-05-12

## 2025-06-13 RX ORDER — POLYETHYLENE GLYCOL 3350 17 G/17G
17 POWDER, FOR SOLUTION ORAL EVERY 24 HOURS
Refills: 0 | Status: ACTIVE | OUTPATIENT
Start: 2025-06-13 | End: 2026-05-12

## 2025-06-13 RX ADMIN — SODIUM CHLORIDE 75 MILLILITER(S): 9 INJECTION, SOLUTION INTRAVENOUS at 16:24

## 2025-06-13 RX ADMIN — Medication 0.5 MILLIGRAM(S): at 16:42

## 2025-06-13 RX ADMIN — INDOCYANINE GREEN AND WATER 5 MILLIGRAM(S): KIT at 13:50

## 2025-06-13 RX ADMIN — SODIUM CHLORIDE 75 MILLILITER(S): 9 INJECTION, SOLUTION INTRAVENOUS at 14:02

## 2025-06-13 RX ADMIN — OXYCODONE HYDROCHLORIDE 5 MILLIGRAM(S): 30 TABLET ORAL at 17:40

## 2025-06-13 RX ADMIN — PROMETHAZINE HYDROCHLORIDE 200 MILLIGRAM(S): 25 INJECTION INTRAMUSCULAR; INTRAVENOUS at 17:48

## 2025-06-13 RX ADMIN — Medication 0.5 MILLIGRAM(S): at 17:02

## 2025-06-13 RX ADMIN — Medication 0.5 MILLIGRAM(S): at 16:24

## 2025-06-13 RX ADMIN — OXYCODONE HYDROCHLORIDE 5 MILLIGRAM(S): 30 TABLET ORAL at 18:10

## 2025-06-13 NOTE — BRIEF OPERATIVE NOTE - FIRST ASSIST PARTICIPATION DETAILS - (COMPONENTS OF THE PROCEDURE THAT ASSISTANT PARTICIPATED IN)
DATE OF VISIT: 6/25/2019    REASON FOR VISIT: Followup for Non-small cell lung cancer-metastatic      HISTORY OF PRESENT ILLNESS: The patient is a very pleasant 62 y.o. male with past medical history significant for EGFR related non-small cell lung cancer-metastatic with widespread bony metastasis and multiple pulmonary nodules, diagnosed February 2018.  He developed a dry cough and shortness of breath in Dec 2017.  A chest XR in January 2018 revealed a right lung mass. A CT scan was ordered and revealed several centimeter right upper  Lung field with associated multiple bilateral nodular densities consistent with metastatic disease.  He was referred and underwent a CT directed needle biopsy on 01/10/2018 which showed malignancy compatible with non-small cell lung cancer.  On January 17, 2018 he underwent a CT FNA.  Pathology was consistent with a non-small cell carcinoma but inadequate tissue specimen for molecular testing.  As part of his workup, he underwent a PET/CT scan today which revealed a moderately sized right pneumothorax. He was also found to have numerous small brain mets. The patient was admitted for a chest tube placement and management of his pneumothorax.  He saw Dr. Silva at [facility] in January 2018. Caris testing performed.Tumor is EGFR [id number] mutated.  Tagrisso was recommended and patient started on 02/2018.  The patient is here today for follow up.    SUBJECTIVE: The patient is here today with his wife. He has been doing fairly well. He continues to take his Tagrisso as prescribed. He does have a cough that he think is related to post-nasal drip. He is taking Zyrtec at night but still has quite a bit of sneezing and congestion. He is eating and drinking well. He denies skin rash or diarrhea. He has no new headaches or visual changes. He denies chest pain or palpitations.     PAST MEDICAL HISTORY/SOCIAL HISTORY/FAMILY HISTORY: Unchanged from prior documentation done on 01/11/2018.     Review of  Systems   Constitutional: Positive for fatigue. Negative for activity change, appetite change, chills, fever and unexpected weight change.   HENT: Positive for congestion and postnasal drip. Negative for hearing loss, mouth sores, nosebleeds, sore throat and trouble swallowing.    Eyes: Negative for visual disturbance.   Respiratory: Positive for cough. Negative for chest tightness, shortness of breath and wheezing.    Cardiovascular: Negative for chest pain, palpitations and leg swelling.   Gastrointestinal: Negative for abdominal distention, abdominal pain, blood in stool, constipation, diarrhea, nausea, rectal pain and vomiting.   Endocrine: Negative for cold intolerance and heat intolerance.   Genitourinary: Negative for difficulty urinating, dysuria, frequency and urgency.   Musculoskeletal: Positive for back pain. Negative for arthralgias, gait problem, joint swelling and myalgias.   Skin: Negative for rash.   Neurological: Negative for dizziness, tremors, syncope, weakness, light-headedness, numbness and headaches.   Hematological: Negative for adenopathy. Does not bruise/bleed easily.   Psychiatric/Behavioral: Negative for confusion, sleep disturbance and suicidal ideas. The patient is not nervous/anxious.          Current Outpatient Medications:   •  Osimertinib Mesylate 80 MG tablet, Take 80 mg by mouth Daily., Disp: 30 tablet, Rfl: 11  •  rivaroxaban (XARELTO) 20 MG tablet, Take 1 tablet by mouth Daily With Dinner., Disp: 90 tablet, Rfl: 3  •  cetirizine-pseudoephedrine (ZyrTEC-D) 5-120 MG per 12 hr tablet, Take 1 tablet by mouth Daily., Disp: 90 tablet, Rfl: 3  •  fluticasone (FLONASE) 50 MCG/ACT nasal spray, 2 sprays into the nostril(s) as directed by provider Daily. Administer 2 sprays in each nostril for each dose., Disp: 3 bottle, Rfl: 3  •  Sod Picosulfate-Mag Ox-Cit Acd 10-3.5-12 MG-GM -GM/160ML solution, Take 1 kit by mouth Take As Directed. Follow instructions that were mailed to your home. If  "you didn't receive these call [phone]., Disp: 2 bottle, Rfl: 0    PHYSICAL EXAMINATION:   /80 Comment: LUE  Pulse 74   Temp 97 °F (36.1 °C) (Temporal)   Resp 16   Ht 177.8 cm (70\")   Wt 104 kg (230 lb)   SpO2 94% Comment: RA  BMI 33.00 kg/m²    ECOG Performance Status: 1 - Symptomatic but completely ambulatory  General Appearance:  alert, cooperative, no apparent distress and appears stated age   Neurologic/Psychiatric: A&O x 3, gait steady, appropriate affect, strength 5/5 in all muscle groups   HEENT:  Normocephalic, without obvious abnormality, mucous membranes moist   Neck: Supple, symmetrical, trachea midline, no adenopathy;  No thyromegaly, masses, or tenderness   Lungs:   Clear to auscultation bilaterally; respirations regular, even, and unlabored bilaterally   Heart:  Regular rate and rhythm, no murmurs appreciated   Abdomen:   Soft, non-tender, non-distended and no organomegaly   Lymph nodes: No cervical, supraclavicular, inguinal or axillary adenopathy noted   Extremities: Normal, atraumatic; no clubbing, cyanosis, or edema    Skin: No rashes, ulcers, or suspicious lesions noted     Hospital Outpatient Visit on 06/24/2019   Component Date Value Ref Range Status   • Creatinine 06/24/2019 1.30  0.60 - 1.30 mg/dL Final    Serial Number: [id number]:  [id number]   Lab on 06/24/2019   Component Date Value Ref Range Status   • Glucose 06/24/2019 86  65 - 99 mg/dL Final   • BUN 06/24/2019 13  8 - 23 mg/dL Final   • Creatinine 06/24/2019 1.25  0.76 - 1.27 mg/dL Final   • Sodium 06/24/2019 144  136 - 145 mmol/L Final   • Potassium 06/24/2019 4.0  3.5 - 5.2 mmol/L Final   • Chloride 06/24/2019 105  98 - 107 mmol/L Final   • CO2 06/24/2019 27.0  22.0 - 29.0 mmol/L Final   • Calcium 06/24/2019 8.9  8.6 - 10.5 mg/dL Final   • Total Protein 06/24/2019 6.8  6.0 - 8.5 g/dL Final   • Albumin 06/24/2019 4.10  3.50 - 5.20 g/dL Final   • ALT (SGPT) 06/24/2019 20  1 - 41 U/L Final   • AST (SGOT) 06/24/2019 " 23  1 - 40 U/L Final   • Alkaline Phosphatase 06/24/2019 113  39 - 117 U/L Final   • Total Bilirubin 06/24/2019 0.4  0.2 - 1.2 mg/dL Final   • eGFR Non  Amer 06/24/2019 58* >60 mL/min/1.73 Final   • Globulin 06/24/2019 2.7  gm/dL Final   • A/G Ratio 06/24/2019 1.5  g/dL Final   • BUN/Creatinine Ratio 06/24/2019 10.4  7.0 - 25.0 Final   • Anion Gap 06/24/2019 12.0  mmol/L Final   • WBC 06/24/2019 5.62  3.40 - 10.80 10*3/mm3 Final   • RBC 06/24/2019 5.14  4.14 - 5.80 10*6/mm3 Final   • Hemoglobin 06/24/2019 16.2  13.0 - 17.7 g/dL Final   • Hematocrit 06/24/2019 49.8  37.5 - 51.0 % Final   • MCV 06/24/2019 96.9  79.0 - 97.0 fL Final   • MCH 06/24/2019 31.5  26.6 - 33.0 pg Final   • MCHC 06/24/2019 32.5  31.5 - 35.7 g/dL Final   • RDW 06/24/2019 13.8  12.3 - 15.4 % Final   • RDW-SD 06/24/2019 50.1  37.0 - 54.0 fl Final   • MPV 06/24/2019 11.2  6.0 - 12.0 fL Final   • Platelets 06/24/2019 148  140 - 450 10*3/mm3 Final   • Neutrophil % 06/24/2019 68.6  42.7 - 76.0 % Final   • Lymphocyte % 06/24/2019 15.7* 19.6 - 45.3 % Final   • Monocyte % 06/24/2019 12.3* 5.0 - 12.0 % Final   • Eosinophil % 06/24/2019 2.5  0.3 - 6.2 % Final   • Basophil % 06/24/2019 0.7  0.0 - 1.5 % Final   • Immature Grans % 06/24/2019 0.2  0.0 - 0.5 % Final   • Neutrophils, Absolute 06/24/2019 3.86  1.70 - 7.00 10*3/mm3 Final   • Lymphocytes, Absolute 06/24/2019 0.88  0.70 - 3.10 10*3/mm3 Final   • Monocytes, Absolute 06/24/2019 0.69  0.10 - 0.90 10*3/mm3 Final   • Eosinophils, Absolute 06/24/2019 0.14  0.00 - 0.40 10*3/mm3 Final   • Basophils, Absolute 06/24/2019 0.04  0.00 - 0.20 10*3/mm3 Final   • Immature Grans, Absolute 06/24/2019 0.01  0.00 - 0.05 10*3/mm3 Final   • nRBC 06/24/2019 0.0  0.0 - 0.2 /100 WBC Final      Ct Chest With Contrast    Result Date: 6/24/2019  Narrative: EXAMINATION: CT CHEST W CONTRAST- 06/24/2019  INDICATION: f/u scan; C79.9-Secondary malignant neoplasm of unspecified site  TECHNIQUE: CT scan of the chest was  performed following intravenous contrast.  The radiation dose reduction device was turned on for each scan per the ALARA (As Low as Reasonably Achievable) protocol.  COMPARISON: 03/18/2019  FINDINGS: There is no axillary lymphadenopathy. There is no mediastinal adenopathy. There are calcified middle mediastinal nodes. There is no pericardial or pleural effusion. Images displayed at lung window settings demonstrate some linear changes as well as micronodularity in the upper lobes. There is a spiculated mass in the right upper lobe medially measuring approximately 1.3 x 2.0 cm, unchanged. There is a single focal area blastic change of the sternum and a similar small focal area of abnormal density in two thoracic vertebra.      Impression: There are multiple small bilateral pulmonary nodules as well as a more dominant nodule in the right upper lobe medially. There are rare sclerotic lesions in the thoracic spine and sternum. All findings are stable when compared to previous examination of 03/18/2019.  D:  06/24/2019 E:  06/24/2019  This report was finalized on 6/24/2019 11:52 AM by Dr. Gary Petty MD.      Ct Abdomen Pelvis With Contrast    Result Date: 6/24/2019  Narrative: EXAMINATION: CT ABDOMEN AND PELVIS W CONTRAST-  INDICATION: Followup scan; C79.9-Secondary malignant neoplasm of unspecified site.  TECHNIQUE: CT scan of the abdomen and pelvis was performed following intravenous contrast.  The radiation dose reduction device was turned on for each scan per the ALARA (As Low as Reasonably Achievable) protocol.  COMPARISON: 03/18/2019.  FINDINGS: The liver and spleen are normal. There is no adrenal or pancreatic mass. There is no renal mass, stone or obstruction. There is no ascites, aneurysm or retroperitoneal lymphadenopathy. There is no pelvic mass or fluid. There is no inguinal lymphadenopathy. There are prostatic calcifications and prostatic enlargement. Lastly, there are blastic changes in the lumbar spine  and pelvis consistent metastatic disease.      Impression: There are blastic changes in the lumbar spine and pelvis, stable when compared with 03/18/2019.  D:  06/24/2019 E:  06/24/2019  This report was finalized on 6/24/2019 11:52 AM by Dr. Gary Petty MD.    (  No results found.    ASSESSMENT: The patient is a very pleasant 62 y.o. male  with Stage IV  Non-small cell lung cancer.     PROBLEM LIST:  1. Stage IV  Non-small cell lung cancer [id number] with brain/bone metastasis:  A. Presented with SOB 01/2018, 01/04/2018 CT chest/abdomen/pelvis: Innumerable bilateral pulmonary nodules with  Expansile lucent lesion within Left iliac bone 3.5cm.   B. Status post bronchoscopy with biopsy done on January 10, 2018 showed adenocarcinoma     C. 01/17/2018 PET/CT: Dominant multiple diffuse pulmonary parenchymal soft tissue lung nodules.  Multiple additional nodules scattered through both lungs too numerous too count. Right Paratracheal LN SUV 3.49.  Osseous metastases noted with 2 lesions in Right pelvis, most prominent Right ilium SUV 7.26.  Rib lesion along L rib cage.  Mild adenopathy base of neck and supraclavicular areas on right and left.   D. MRI brain 01/18/2018: Numerous but small diffusely scattered enhancing nodules.  Largest lesion is 5mm. No evidence of hemorrhage or significant vasogenic edema.   E. Molecular testing revealed EGFR exon 19 mutation, PDL 10%, and p53 exon 17 mutation.  E. Started Tagrisso 02/2018.   2. Seasonal allergies  3. Anxiety  4.  Low back pain  5.  Right lower extremity DVT    PLAN:  1.  I will continue Tagrisso until progression or an acceptable toxicity.  2. I reviewed the CAT scan results with the patient and his wife. I reviewed the images myself. I told the patient he has stable findings with no evidence of new or progressive disease with stable right lateral lung nodules and bone metastases.    3. We will repeat CAT scans as well as MRI of the brain in 3 months, which will be due  9/24/2019. This will be 6 month follow up on his MRI.   4.  I will continue to monitor the patient's blood work including blood counts, kidney function, and liver enzymes throughout treatment. I reassured the patient that his blood counts are normal, with stable kidney function and normal liver enzymes.   5.  We will consider adding Xgeva in the future if needed for progressive bony disease or symptomatic metastasis.   6. We will order echocardiogram and EKG for prior to return as recommended in the package insert for monitoring for cardiotoxicity from Tagrisso.   7. I recommended the patient try Zyrtec D for allergies and sinus congestion as well as post-nasal drip that causes cough. We will also add Flonase for allergy symptoms. We will continue Zyrtec for seasonal allergies. He was given prescriptions for both of these today.   8. I reviewed with the patient that if he has progressive disease there are other treatment options available per NCCN guidelines.   9. The patient has stopped his Lexapro for anxiety. He is doing well without it, except for some early wakening. He will notify us if he thinks he needs to restart this or if he needs something for sleep.   10.  We will continue Xarelto twice a day for DVT. He was given a refill on this prescription today.     Nieves Garcia, APRN  6/25/2019  3:15 PM     I acted within this role throughout the entirety of the procedure performed by the primary surgeon

## 2025-06-13 NOTE — ASU DISCHARGE PLAN (ADULT/PEDIATRIC) - COMMENTS
Follow up with Dr. Tellez in 1 week. Call the office to schedule an appointment if you do not already have one.

## 2025-06-13 NOTE — ASU DISCHARGE PLAN (ADULT/PEDIATRIC) - CARE PROVIDER_API CALL
Clarke Tellez  Surgery (General Surgery)  [address], [address]  [place] [address]  Phone: [phone]  Fax: [phone]  Follow Up Time:

## 2025-06-13 NOTE — ASU DISCHARGE PLAN (ADULT/PEDIATRIC) - ASU DC SPECIAL INSTRUCTIONSFT
Keep steri-strips clean, dry and intact x 24 hrs. Apply water proof ice pack 20 mins on, 20 mins off to help decrease pain and swelling. After 24 hrs, you may begin showering as usual but Do NOT remove steri-strips. Do not scrub or soak incision site. Pat dry. NO tub baths, NO swimming pools. No hot tubs.  No heavy lifting over 15 lbs.  Take frequent walks and do deep breathing exercises.  You may take over-the-counter pain medication such as tylenol or motrin as directed as needed for pain.  A prescription for oxycodone has been sent to your pharmacy to take as needed for any pain NOT relieved with over-the-counter pain medication.   You may take an over-the-counter stool softener such as miralax or colace as needed for any constipation while taking oxycodone for pain.  DO NOT DRIVE WHILE TAKING PAIN MEDICATION.

## 2025-06-13 NOTE — ASU DISCHARGE PLAN (ADULT/PEDIATRIC) - FINANCIAL ASSISTANCE
[facility] provides services at a reduced cost to those who are determined to be eligible through [facility]’s financial assistance program. Information regarding [facility]’s financial assistance program can be found by going to [email or web address] or by calling [phone].

## 2025-06-16 PROBLEM — F41.9 ANXIETY DISORDER, UNSPECIFIED: Chronic | Status: ACTIVE | Noted: 2025-06-05

## 2025-06-16 PROBLEM — F90.9 ATTENTION-DEFICIT HYPERACTIVITY DISORDER, UNSPECIFIED TYPE: Chronic | Status: ACTIVE | Noted: 2025-06-05

## 2025-06-17 ENCOUNTER — APPOINTMENT (OUTPATIENT)
Dept: SURGERY | Facility: CLINIC | Age: 50
End: 2025-06-17
Payer: COMMERCIAL

## 2025-06-17 LAB — SURGICAL PATHOLOGY STUDY: SIGNIFICANT CHANGE UP

## 2025-06-17 PROCEDURE — 99024 POSTOP FOLLOW-UP VISIT: CPT

## 2025-06-18 ENCOUNTER — APPOINTMENT (OUTPATIENT)
Dept: UROGYNECOLOGY | Facility: CLINIC | Age: 50
End: 2025-06-18

## 2025-06-18 PROBLEM — J30.2 OTHER SEASONAL ALLERGIC RHINITIS: Chronic | Status: ACTIVE | Noted: 2025-06-05

## 2025-06-18 PROBLEM — K80.12 CALCULUS OF GALLBLADDER WITH ACUTE AND CHRONIC CHOLECYSTITIS WITHOUT OBSTRUCTION: Chronic | Status: ACTIVE | Noted: 2025-06-05

## 2025-06-18 PROBLEM — K90.0 CELIAC DISEASE: Chronic | Status: ACTIVE | Noted: 2025-06-05

## 2025-06-20 ENCOUNTER — APPOINTMENT (OUTPATIENT)
Dept: UROGYNECOLOGY | Facility: CLINIC | Age: 50
End: 2025-06-20

## 2025-06-26 ENCOUNTER — APPOINTMENT (OUTPATIENT)
Dept: SURGERY | Facility: CLINIC | Age: 50
End: 2025-06-26
Payer: COMMERCIAL

## 2025-06-26 PROCEDURE — 99024 POSTOP FOLLOW-UP VISIT: CPT

## 2025-07-15 ENCOUNTER — APPOINTMENT (OUTPATIENT)
Dept: SURGERY | Facility: CLINIC | Age: 50
End: 2025-07-15

## (undated) DEVICE — VENODYNE/SCD SLEEVE CALF LARGE

## (undated) DEVICE — SOL IRR POUR NS 0.9% 1000ML

## (undated) DEVICE — SOL IRR BAG NS 0.9% 1000ML

## (undated) DEVICE — XI ENDOWRIST 12 - 8 MM CANNULA REDUCER

## (undated) DEVICE — DRAPE TOWEL BLUE 17" X 24"

## (undated) DEVICE — XI DRAPE COLUMN

## (undated) DEVICE — TUBING SUCTION CONN 6FT STERILE

## (undated) DEVICE — ELCTR GROUNDING PAD ADULT COVIDIEN

## (undated) DEVICE — SUCTION YANKAUER TAPERED BULBOUS NO VENT

## (undated) DEVICE — ENDOCATCH 10MM

## (undated) DEVICE — VENODYNE/SCD SLEEVE CALF MEDIUM

## (undated) DEVICE — PACK GENERAL LAPAROSCOPY

## (undated) DEVICE — BLADE SCALPEL SAFETYLOCK #15

## (undated) DEVICE — CATH IV SAFE BC 22G X 1" (BLUE)

## (undated) DEVICE — CANISTER SUCTION 1200CC 10/SL

## (undated) DEVICE — PLV-SCD MACHINE: Type: DURABLE MEDICAL EQUIPMENT

## (undated) DEVICE — ELCTR BOVIE TIP BLADE INSULATED 2.75" EDGE

## (undated) DEVICE — FORMALIN CUPS 10% BUFFERED

## (undated) DEVICE — XI CORD MONOPOLAR CAUTERY (GREEN)

## (undated) DEVICE — SUT POLYSORB 2-0 30" V-20 UNDYED

## (undated) DEVICE — SUT HEWSON RETRIEVER

## (undated) DEVICE — PACK IV START WITH CHG

## (undated) DEVICE — DRSG MASTISOL

## (undated) DEVICE — MASK O2 NON REBREATH 3IN1 ADULT

## (undated) DEVICE — TUBING STRYKER PNEUMOCLEAR HIGH FLOW

## (undated) DEVICE — FORCEP RADIAL JAW 4 W NDL 2.4MM 2.8MM 240CM ORANGE DISP

## (undated) DEVICE — Device

## (undated) DEVICE — SYR LUER SLIP TIP 30CC

## (undated) DEVICE — SNARE CAPTIVATOR II RND COLD 10MM

## (undated) DEVICE — TRAP QUICK CATCH  SINGL CHAMBER

## (undated) DEVICE — ENDOCUFF VISION SZ 3 SM PRPL

## (undated) DEVICE — CATH IV SAFE BC 20G X 1.16" (PINK)

## (undated) DEVICE — NDL HYPO SAFE 25G X 1.5" (ORANGE)

## (undated) DEVICE — SYR IV POSIFLUSH NS 3ML 30/TY

## (undated) DEVICE — LIGASURE MARYLAND 5MM X 37CM

## (undated) DEVICE — XI ENDOWRIST SUCTION IRRIGATOR 8MM

## (undated) DEVICE — SMOKE EVACUTATION SYS LAPROSCOPIC AC/PA

## (undated) DEVICE — SNARE POLYP SENS 27MM 240CM

## (undated) DEVICE — STERIS DEFENDO 3-PIECE KIT (AIR/WATER, SUCTION & BIOPSY VALVES)

## (undated) DEVICE — POLY TRAP ETRAP

## (undated) DEVICE — SUT POLYSORB 0 30" GU-46

## (undated) DEVICE — MASK OXYGEN PANORAMIC

## (undated) DEVICE — SOL IRR POUR H2O 1000ML

## (undated) DEVICE — DRSG CURITY GAUZE SPONGE 4 X 4" 12-PLY

## (undated) DEVICE — TUBING ENDO EXT OLYMPUS 160 24HR USE

## (undated) DEVICE — TUBE O2 SUPL CRUSH RESIS CONN SOUTHSIDE ONLY

## (undated) DEVICE — XI CORD BIPOLAR CAUTERY (BLUE)

## (undated) DEVICE — SNARE LRG

## (undated) DEVICE — XI CANNULA SEAL 5MM - 8 MM

## (undated) DEVICE — SOL IRR POUR H2O 500ML

## (undated) DEVICE — TUBING IV SET GRAVITY 3Y 100" MACRO

## (undated) DEVICE — TUBING IV SET SECONDARY 34"

## (undated) DEVICE — NDL INJ SCLERO INTERJECT 23G

## (undated) DEVICE — RETRIEVER ROTH NET PLATINUM-UNIVERSAL

## (undated) DEVICE — VALVE BIOPSY

## (undated) DEVICE — D HELP - CLEARVIEW CLEARIFY SYSTEM

## (undated) DEVICE — SUT MONOCRYL 4-0 27" PS-2 UNDYED

## (undated) DEVICE — TUBE RECTAL 24FR

## (undated) DEVICE — WARMING BLANKET UPPER ADULT

## (undated) DEVICE — MARKER ENDO SPOT EX

## (undated) DEVICE — XI OBTURATOR OPTICAL BLADELESS 8MM

## (undated) DEVICE — SENSOR O2 FINGER XL ADULT 24/BX 6BX/CA

## (undated) DEVICE — PLV/PSP-ESU FORCEFX F8I7418A: Type: DURABLE MEDICAL EQUIPMENT

## (undated) DEVICE — PRESSURE INFUSOR BAG 1000ML

## (undated) DEVICE — ENDOCUFF VISION SZ 2 LG GRN

## (undated) DEVICE — XI 12MM AND STAPLER CANNULA SEAL

## (undated) DEVICE — SYR LUER SLIP TIP 50CC

## (undated) DEVICE — TUBING CANNULA SALTER LABS NASAL ADULT 7FT

## (undated) DEVICE — DRSG OPSITE 2.5 X 2"

## (undated) DEVICE — GLV 8 PROTEXIS (WHITE)

## (undated) DEVICE — FORCEP RADIAL JAW 4 JUMBO 2.8MM 3.2MM 240CM ORANGE DISP

## (undated) DEVICE — BRUSH COLONOSCOPY CYTOLOGY

## (undated) DEVICE — XI DRAPE ARM

## (undated) DEVICE — DRAPE 3/4 SHEET W REINFORCEMENT 56X77"